# Patient Record
Sex: FEMALE | Race: WHITE | NOT HISPANIC OR LATINO
[De-identification: names, ages, dates, MRNs, and addresses within clinical notes are randomized per-mention and may not be internally consistent; named-entity substitution may affect disease eponyms.]

---

## 2018-01-23 PROBLEM — Z00.00 ENCOUNTER FOR PREVENTIVE HEALTH EXAMINATION: Status: ACTIVE | Noted: 2018-01-23

## 2018-01-29 ENCOUNTER — APPOINTMENT (OUTPATIENT)
Dept: OBGYN | Facility: CLINIC | Age: 68
End: 2018-01-29
Payer: COMMERCIAL

## 2018-01-29 VITALS
HEIGHT: 62.5 IN | RESPIRATION RATE: 16 BRPM | DIASTOLIC BLOOD PRESSURE: 100 MMHG | TEMPERATURE: 98.7 F | WEIGHT: 140 LBS | SYSTOLIC BLOOD PRESSURE: 152 MMHG | BODY MASS INDEX: 25.12 KG/M2

## 2018-01-29 DIAGNOSIS — Z86.79 PERSONAL HISTORY OF OTHER DISEASES OF THE CIRCULATORY SYSTEM: ICD-10-CM

## 2018-01-29 DIAGNOSIS — Z87.39 PERSONAL HISTORY OF OTHER DISEASES OF THE MUSCULOSKELETAL SYSTEM AND CONNECTIVE TISSUE: ICD-10-CM

## 2018-01-29 DIAGNOSIS — N95.1 MENOPAUSAL AND FEMALE CLIMACTERIC STATES: ICD-10-CM

## 2018-01-29 PROCEDURE — 82270 OCCULT BLOOD FECES: CPT

## 2018-01-29 PROCEDURE — 99387 INIT PM E/M NEW PAT 65+ YRS: CPT

## 2018-02-07 LAB — CYTOLOGY CVX/VAG DOC THIN PREP: NORMAL

## 2018-02-22 ENCOUNTER — APPOINTMENT (OUTPATIENT)
Dept: OBGYN | Facility: CLINIC | Age: 68
End: 2018-02-22
Payer: COMMERCIAL

## 2018-02-22 ENCOUNTER — APPOINTMENT (OUTPATIENT)
Dept: ANTEPARTUM | Facility: CLINIC | Age: 68
End: 2018-02-22
Payer: COMMERCIAL

## 2018-02-22 ENCOUNTER — ASOB RESULT (OUTPATIENT)
Age: 68
End: 2018-02-22

## 2018-02-22 DIAGNOSIS — D25.1 INTRAMURAL LEIOMYOMA OF UTERUS: ICD-10-CM

## 2018-02-22 DIAGNOSIS — D25.2 INTRAMURAL LEIOMYOMA OF UTERUS: ICD-10-CM

## 2018-02-22 DIAGNOSIS — R10.2 PELVIC AND PERINEAL PAIN: ICD-10-CM

## 2018-02-22 PROCEDURE — 76857 US EXAM PELVIC LIMITED: CPT

## 2018-02-22 PROCEDURE — 76830 TRANSVAGINAL US NON-OB: CPT

## 2018-02-22 PROCEDURE — 99214 OFFICE O/P EST MOD 30 MIN: CPT

## 2019-03-11 ENCOUNTER — EMERGENCY (EMERGENCY)
Facility: HOSPITAL | Age: 69
LOS: 0 days | Discharge: HOME | End: 2019-03-11
Attending: EMERGENCY MEDICINE | Admitting: EMERGENCY MEDICINE
Payer: MEDICARE

## 2019-03-11 VITALS — HEIGHT: 61 IN | WEIGHT: 134.92 LBS

## 2019-03-11 VITALS
SYSTOLIC BLOOD PRESSURE: 143 MMHG | TEMPERATURE: 98 F | HEART RATE: 78 BPM | RESPIRATION RATE: 16 BRPM | OXYGEN SATURATION: 98 % | DIASTOLIC BLOOD PRESSURE: 83 MMHG

## 2019-03-11 DIAGNOSIS — S91.312A LACERATION WITHOUT FOREIGN BODY, LEFT FOOT, INITIAL ENCOUNTER: ICD-10-CM

## 2019-03-11 DIAGNOSIS — Z79.82 LONG TERM (CURRENT) USE OF ASPIRIN: ICD-10-CM
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
## 2019-03-11 DIAGNOSIS — Z88.2 ALLERGY STATUS TO SULFONAMIDES: ICD-10-CM

## 2019-03-11 DIAGNOSIS — Z87.442 PERSONAL HISTORY OF URINARY CALCULI: ICD-10-CM

## 2019-03-11 DIAGNOSIS — M19.90 UNSPECIFIED OSTEOARTHRITIS, UNSPECIFIED SITE: ICD-10-CM
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
## 2019-03-11 DIAGNOSIS — Z23 ENCOUNTER FOR IMMUNIZATION: ICD-10-CM

## 2019-03-11 DIAGNOSIS — H40.9 UNSPECIFIED GLAUCOMA: ICD-10-CM

## 2019-03-11 PROCEDURE — 12001 RPR S/N/AX/GEN/TRNK 2.5CM/<: CPT

## 2019-03-11 PROCEDURE — 73630 X-RAY EXAM OF FOOT: CPT | Mod: 26,LT

## 2019-03-11 PROCEDURE — 99283 EMERGENCY DEPT VISIT LOW MDM: CPT

## 2019-03-11 RX ORDER — TETANUS TOXOID, REDUCED DIPHTHERIA TOXOID AND ACELLULAR PERTUSSIS VACCINE, ADSORBED 5; 2.5; 8; 8; 2.5 [IU]/.5ML; [IU]/.5ML; UG/.5ML; UG/.5ML; UG/.5ML
0.5 SUSPENSION INTRAMUSCULAR ONCE
Qty: 0 | Refills: 0 | Status: COMPLETED | OUTPATIENT
Start: 2019-03-11 | End: 2019-03-11

## 2019-03-11 RX ORDER — CEPHALEXIN 500 MG
500 CAPSULE ORAL
Qty: 0 | Refills: 0 | Status: DISCONTINUED | OUTPATIENT
Start: 2019-03-11 | End: 2019-03-11

## 2019-03-11 RX ORDER — CEPHALEXIN 500 MG
1 CAPSULE ORAL
Qty: 28 | Refills: 0 | OUTPATIENT
Start: 2019-03-11 | End: 2019-03-17

## 2019-03-11 RX ORDER — IBUPROFEN 200 MG
1 TABLET ORAL
Qty: 20 | Refills: 0 | OUTPATIENT
Start: 2019-03-11 | End: 2019-03-15

## 2019-03-11 RX ADMIN — TETANUS TOXOID, REDUCED DIPHTHERIA TOXOID AND ACELLULAR PERTUSSIS VACCINE, ADSORBED 0.5 MILLILITER(S): 5; 2.5; 8; 8; 2.5 SUSPENSION INTRAMUSCULAR at 20:05

## 2019-03-11 RX ADMIN — Medication 500 MILLIGRAM(S): at 20:04

## 2019-03-11 NOTE — ED ADULT NURSE NOTE - NSIMPLEMENTINTERV_GEN_ALL_ED
Implemented All Universal Safety Interventions:  [place] to call system. Call bell, personal items and telephone within reach. Instruct patient to call for assistance. Room bathroom lighting operational. Non-slip footwear when patient is off stretcher. Physically safe environment: no spills, clutter or unnecessary equipment. Stretcher in lowest position, wheels locked, appropriate side rails in place.

## 2019-03-11 NOTE — ED STATDOCS - NS_ ATTENDINGSCRIBEDETAILS _ED_A_ED_FT
The scribe's documentation has been prepared under my direction and personally reviewed by me in its entirety.  I confirm that the note above accurately reflects all my work, treatment, procedures, and decision making except where otherwise noted or amended by me.  George Lu M.D.

## 2019-03-11 NOTE — ED STATDOCS - ATTENDING CONTRIBUTION TO CARE
ZOEY Lu MD,  performed the initial face to face bedside interview with this patient regarding history of present illness, review of symptoms and relevant past medical, social and family history.  I completed an independent physical examination.  I was the initial provider who evaluated this patient. I have signed out the follow up of any pending tests (i.e. labs, radiological studies) to the ACP.  I have communicated the patient’s plan of care and disposition with the ACP.

## 2019-03-11 NOTE — ED ADULT NURSE NOTE - OBJECTIVE STATEMENT
pt a/ox3 to ed c/o right foot lac s/p dropping a glass bowl and stepping on it with Right foot while cooking dinner this evening. pt has gauze/kurlex wrapped on foot when arriving to ED area. no uncontrolled bleeding at this time. pt unknown when last tentus shot was. pt reports pain in Right foot 5/10. pt hesitant to ambulate of foot at this time, wheelchair provided.

## 2019-03-11 NOTE — ED ADULT TRIAGE NOTE - CHIEF COMPLAINT QUOTE
c/o left foot laceration from broken bowl, pt states while drop glass bowl on kitchen floor and then step on it. pt placed pressure dressing to foot, pt states she had one glass of wine this evening

## 2019-03-11 NOTE — ED STATDOCS - PROGRESS NOTE DETAILS
70 y/o F with PMHx of Glaucoma, OA, on ASA presenting to the ED c/o left foot laceration today. Reports that she dropped a glass salad bowl on the floor and stepped on the glass while barefoot, resulting in a laceration to the plantar aspect of her left foot. Denies any fevers, chills, abd pain, N/V/D, dizziness, lightheadedness, CP, or SOB. Allergic to Sulfa.  Galina Dorman PA-C Plan for podiatry to repair laceration.  Galina Dorman PA-C Podiatry completed laceration repair.  Will dc home with PO Keflex and Ibuprofen.  F/U with Dr. Garcia.  Keep dressing dry and clean until F/U.  Call office for appt.  Halley Hurst PA-C

## 2019-03-11 NOTE — ED STATDOCS - OBJECTIVE STATEMENT
70 y/o F with PMHx of Glaucoma, OA, on ASA presenting to the ED c/o left foot laceration today. Reports that she dropped a glass salad bowl on the floor and stepped on the glass while barefoot, resulting in a laceration to the plantar aspect of her left foot. Denies any fevers, chills, abd pain, N/V/D, dizziness, lightheadedness, CP, or SOB. Allergic to Sulfa.

## 2019-03-11 NOTE — ED STATDOCS - CLINICAL SUMMARY MEDICAL DECISION MAKING FREE TEXT BOX
70 y/o F with PMHx of Glaucoma, OA, on ASA presenting to the ED c/o left foot laceration today. 68 y/o F with PMHx of Glaucoma, OA, on ASA presenting to the ED c/o left foot laceration today. Exam with 3cm left foot laceration to the plantar, anterior surface. Plan: wound care, consult podiatry, X-ray, symptomatic treatment, reassess.

## 2019-12-25 PROBLEM — R10.2 PELVIC PAIN IN FEMALE: Status: ACTIVE | Noted: 2018-01-29

## 2022-03-16 ENCOUNTER — APPOINTMENT (OUTPATIENT)
Dept: GASTROENTEROLOGY | Facility: CLINIC | Age: 72
End: 2022-03-16

## 2022-03-24 NOTE — ED STATDOCS - CCCP TRG CHIEF CMPLNT
lacerations High Dose Vitamin A Pregnancy And Lactation Text: High dose vitamin A therapy is contraindicated during pregnancy and breast feeding.

## 2022-08-10 ENCOUNTER — EMERGENCY (EMERGENCY)
Facility: HOSPITAL | Age: 72
LOS: 0 days | Discharge: HOME | End: 2022-08-10
Attending: EMERGENCY MEDICINE
Payer: MEDICARE

## 2022-08-10 VITALS
OXYGEN SATURATION: 100 % | RESPIRATION RATE: 18 BRPM | HEART RATE: 66 BPM | TEMPERATURE: 98 F | DIASTOLIC BLOOD PRESSURE: 80 MMHG | SYSTOLIC BLOOD PRESSURE: 147 MMHG

## 2022-08-10 VITALS
SYSTOLIC BLOOD PRESSURE: 137 MMHG | TEMPERATURE: 99 F | OXYGEN SATURATION: 96 % | RESPIRATION RATE: 17 BRPM | DIASTOLIC BLOOD PRESSURE: 83 MMHG | HEART RATE: 73 BPM
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
## 2022-08-10 DIAGNOSIS — S01.01XA LACERATION WITHOUT FOREIGN BODY OF SCALP, INITIAL ENCOUNTER: ICD-10-CM

## 2022-08-10 DIAGNOSIS — Z87.442 PERSONAL HISTORY OF URINARY CALCULI: ICD-10-CM

## 2022-08-10 DIAGNOSIS — Z88.2 ALLERGY STATUS TO SULFONAMIDES: ICD-10-CM

## 2022-08-10 DIAGNOSIS — M19.90 UNSPECIFIED OSTEOARTHRITIS, UNSPECIFIED SITE: ICD-10-CM

## 2022-08-10 DIAGNOSIS — Z90.89 ACQUIRED ABSENCE OF OTHER ORGANS: ICD-10-CM
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
## 2022-08-10 DIAGNOSIS — S09.90XA UNSPECIFIED INJURY OF HEAD, INITIAL ENCOUNTER: ICD-10-CM

## 2022-08-10 DIAGNOSIS — H40.9 UNSPECIFIED GLAUCOMA: ICD-10-CM

## 2022-08-10 PROCEDURE — 12002 RPR S/N/AX/GEN/TRNK2.6-7.5CM: CPT

## 2022-08-10 PROCEDURE — 99284 EMERGENCY DEPT VISIT MOD MDM: CPT | Mod: FS,25

## 2022-08-10 PROCEDURE — 70450 CT HEAD/BRAIN W/O DYE: CPT | Mod: MA

## 2022-08-10 PROCEDURE — 70450 CT HEAD/BRAIN W/O DYE: CPT | Mod: 26,MA

## 2022-08-10 PROCEDURE — 99284 EMERGENCY DEPT VISIT MOD MDM: CPT | Mod: 25

## 2022-08-10 RX ORDER — BISOPROLOL FUMARATE 10 MG/1
0 TABLET, FILM COATED ORAL
Qty: 0 | Refills: 0 | DISCHARGE

## 2022-08-10 RX ORDER — LOSARTAN POTASSIUM 100 MG/1
0 TABLET, FILM COATED ORAL
Qty: 0 | Refills: 0 | DISCHARGE

## 2022-08-10 NOTE — ED STATDOCS - PATIENT PORTAL LINK FT
You can access the FollowMyHealth Patient Portal offered by [facility] by registering at the following website: [email or web address]. By joining [employer]’s FollowMyHealth portal, you will also be able to view your health information using other applications (apps) compatible with our system.

## 2022-08-10 NOTE — ED STATDOCS - OBJECTIVE STATEMENT
72 year old female with PMHx of glaucoma, osteoarthritis, renal calculi presents to the ED c/o laceration to eyebrow s/p head trauma. Pt was putting something into the back of her car, when she went back and her forehead on corner of trunk and suffered a laceration to eyebrow. No n/v, LOC. No aspirin or anticoag use. No other injuries or complaints. 72 year old female with PMHx of glaucoma, osteoarthritis, renal calculi presents to the ED c/o laceration to eyebrow s/p head trauma. Pt was putting something into the back of her car, when she went back and her forehead on corner of car door and suffered a laceration to eyebrow. No n/v, LOC. No aspirin or anticoag use. No other injuries or complaints.

## 2022-08-10 NOTE — ED STATDOCS - PROGRESS NOTE DETAILS
PA note: CT head NEG, results discussed in detail with patient. LAC repaired under sterile fashion, see procedure note. Patient re-examined and re-evaluated. Patient feels much better at this time. ED evaluation, Diagnosis and management discussed with the patient in detail. Workup results discussed with ED attending, OK to dc home. Close PMD follow up encouraged, aftercare to assist with scheduling appointment ASAP. Strict ED return instructions discussed in detail and patient given the opportunity to ask any questions about their discharge diagnosis and instructions. Patient verbalized understanding. ~ Frederick Valladares PA-C PA: Patient is a 72 year old female with PMHx of glaucoma, osteoarthritis, renal calculi who presents to [facility] c/o head LAC after she got hit by a car trunk gate. No LOC. Pt was putting something into the back of her car, when she went back and her forehead hit on corner of car's trunk door and suffered a laceration. No n/v, LOC. No aspirin or anticoag use. No other injuries or complaints. TDAP is up to date, last received in 2019. ~Frederick Valladares PA-C

## 2022-08-10 NOTE — ED ADULT TRIAGE NOTE - AS HEIGHT TYPE
Propranolol Counseling:  I discussed with the patient the risks of propranolol including but not limited to low heart rate, low blood pressure, low blood sugar, restlessness and increased cold sensitivity. They should call the office if they experience any of these side effects. stated

## 2022-08-10 NOTE — ED STATDOCS - NS ED ROS FT
Constitutional: nad, well appearing  HEENT:  no nasal congestion, eye drainage or ear pain.    CVS:  no cp  Resp:  No sob, no cough  GI:  no abdominal pain, no nausea or vomiting  :  no dysuria  MSK: no joint pain or limited ROM  Skin: no rash. +laceration  Neuro: no change in mental status or level of consciousness  Heme/lymph: no bleeding

## 2022-08-10 NOTE — ED STATDOCS - NSFOLLOWUPINSTRUCTIONS_ED_ALL_ED_FT
Sutures, Staples, or Adhesive Wound Closure      Wound closure refers to holding skin and underlying tissue together while it heals, such as after surgery or after an injury. Health care providers use stitches (sutures), staples, and special types of glue (skin adhesives) to close wounds. Your health care provider will use a wound closure method that helps you heal quickly and reduces the chances of infection or scarring. The type of wound closure depends on the location, size, and depth of your wound.    In most cases, wounds are closed as soon as possible (primary skin closure). Sometimes, closure is delayed so the wound can be cleaned and then can heal naturally over weeks or months (delayed wound closure). This reduces the chance of infection.      What are the different types of wound closure?    Adhesive glue     To use adhesive glue, your health care provider holds the edges of the wound together and paints the glue on the surface of your skin. You may need more than one layer of glue. Once the glue is dry, the wound may be covered with a dressing.    This type of skin closure may be used for small wounds that are not deep (superficial wounds). It is often used for children and on facial wounds. Adhesive glue is less painful than other methods of wound closure, and it does not require a medicine to numb the area (local anesthetic). This method also leaves nothing to be removed.    Adhesive glue cannot be used for wounds that are deep, uneven, or bleeding. It is not used inside of a wound.    Adhesive strips     These strips are made of paper that is sticky (adhesive) and has many small holes in it (is porous). They are applied across your wound edges like a regular bandage.    Adhesive strips may be used to close very shallow wounds. They may be used along with sutures to improve skin closure.    Sutures     Sutures come in many different materials, strengths, and sizes. They may break down as your wound heals (absorbable), or they may need to be removed (nonabsorbable).    Your health care provider sews your skin or the tissues under your skin together with sutures and a steel needle. Your skin edges may be closed in one long (continuous) stitch or in separate stitches. Then the sutures are tied and cut.    Sutures can be used for all kinds of wounds. Absorbable sutures may be used to close tissues under the skin. Sutures can cause a skin reaction that can lead to infection.    Staples     When staples are used to close a wound, the edges of your skin on both sides of the wound are brought close together. A staple is placed across the wound, and an instrument secures the staple edges together.    Staples are often used to close surgical incisions. They are faster to use than sutures, and they cause less skin reaction. Staples need to be removed using a tool that bends the staples away from your skin.      Follow these instructions at home:    Medicines     •Take over-the-counter and prescription medicines only as told by your health care provider.      •If you were prescribed an antibiotic medicine, take it as told by your health care provider. Do not stop taking the antibiotic even if you start to feel better.        Wound care      •Follow instructions from your health care provider about how to take care of your wound and dressing.      •Wash your hands with soap and water before and after touching your wound or dressing. If soap and water are not available, use hand .    • Do not try to remove your wound closures unless your health care provider tells you to do that. You may need a follow-up visit with your health care provider to remove your closures.  •Wound closures may stay in place for 2 weeks or longer.      •Absorbable sutures may dissolve after a few days or weeks.      •If adhesive strip edges start to loosen and curl up, you may trim the loose edges.        • Do not pick at your wound. Picking can cause an infection.      •Apply ointments or creams only as told by your health care provider.    •Check your wound every day for signs of infection. Check for:  •Redness, swelling, or pain.      •Fluid or blood.      •Warmth.      •Pus or a bad smell.        General instructions     • Do not take baths, swim, or use a hot tub until your health care provider approves. Ask your health care provider if you may take showers. You may only be allowed to take sponge baths.      • Do not soak your wound in water.      •Keep all follow-up visits as told by your health care provider. This is important.        Contact a health care provider if you:    •Have a fever or chills.      •Have redness, swelling, or pain around your wound.      •Have fluid or blood coming from your wound.      •Notice that your wound feels warm to the touch.      •Notice pus or a bad smell coming from your wound.      •Notice that the edges of your wound start to separate after your sutures come out.      •Notice that your wound becomes thick, raised, and darker in color after your sutures come out (scarring).        Summary    •The type of wound closure that your health care provider will use depends on the location, size, and depth of your wound. Options to close wounds include stitches (sutures), staples, special types of glue (skin adhesives), and adhesive strips.      •Your health care provider will use a wound closure method that helps you heal quickly and reduces the chances of infection or scarring.      • Do not soak your wound in water. Do not take baths, shower, swim, or use a hot tub until your health care provider approves.      This information is not intended to replace advice given to you by your health care provider. Make sure you discuss any questions you have with your health care provider.                                                                                                                                                                                Head Injury, Adult       There are many types of head injuries. Head injuries can be as minor as a small bump, or they can be a serious medical issue. More severe head injuries include:  •A jarring injury to the brain (concussion).      •A bruise (contusion) of the brain. This means there is bleeding in the brain that can cause swelling.      •A cracked skull (skull fracture).      •Bleeding in the brain that collects, clots, and forms a bump (hematoma).      After a head injury, most problems occur within the first 24 hours, but side effects may occur up to 7–10 days after the injury. It is important to watch your condition for any changes. You may need to be observed in the emergency department or urgent care, or you may be admitted to the hospital.      What are the causes?    There are many possible causes of a head injury. Serious head injuries may be caused by car accidents, bicycle or motorcycle accidents, sports injuries, falls, or being struck by an object.      What are the symptoms?    Symptoms of a head injury include a contusion, bump, or bleeding at the site of the injury. Other physical symptoms may include:  •Headache.      •Nausea or vomiting.      •Dizziness.      •Blurred or double vision.      •Being uncomfortable around bright lights or loud noises.      •Seizures.      •Feeling tired.      •Trouble being awakened.      •Loss of consciousness.      Mental or emotional symptoms may include:  •Irritability.      •Confusion and memory problems.      •Poor attention and concentration.      •Changes in eating or sleeping habits.      •Anxiety or depression.        How is this diagnosed?    This condition can usually be diagnosed based on your symptoms, a description of the injury, and a physical exam. You may also have imaging tests done, such as a CT scan or an MRI.      How is this treated?    Treatment for this condition depends on the severity and type of injury you have. The main goal of treatment is to prevent complications and allow the brain time to heal.    Mild head injury     If you have a mild head injury, you may be sent home, and treatment may include:  •Observation. A responsible adult should stay with you for 24 hours after your injury and check on you often.      •Physical rest.      •Brain rest.      •Pain medicines.      Severe head injury    If you have a severe head injury, treatment may include:•Close observation. This includes hospitalization with the following care:  •Frequent physical exams.      •Frequent checks of how your brain and nervous system are working (neurological status).      •Checking your blood pressure and oxygen levels.        •Medicines to relieve pain, prevent seizures, and decrease brain swelling.      •Airway protection and breathing support. This may include using a ventilator.      •Treatments that monitor and manage swelling inside the brain.    •Brain surgery. This may be needed to:  •Remove a collection of blood or blood clots.      •Stop the bleeding.      •Remove a part of the skull to allow room for the brain to swell.          Follow these instructions at home:    Activity     •Rest and avoid activities that are physically hard or tiring.      •Make sure you get enough sleep.    •Let your brain rest by limiting activities that require a lot of thought or attention, such as:  •Watching TV.      •Playing memory games and puzzles.      •Job-related work or homework.      •Working on the computer, using social media, and texting.        •Avoid activities that could cause another head injury, such as playing sports, until your health care provider approves. Having another head injury, especially before the first one has healed, can be dangerous.      •Ask your health care provider when it is safe for you to return to your regular activities, including work or school. Ask your health care provider for a step-by-step plan for gradually returning to activities.      •Ask your health care provider when you can drive, ride a bicycle, or use heavy machinery. Your ability to react may be slower after a brain injury. Do not do these activities if you are dizzy.        Lifestyle      • Do not drink alcohol until your health care provider approves. Do not use drugs. Alcohol and certain drugs may slow your recovery and can put you at risk of further injury.      •If it is harder than usual to remember things, write them down.      •If you are easily distracted, try to do one thing at a time.      •Talk with family members or close friends when making important decisions.      •Tell your friends, family, a trusted colleague, and  about your injury, symptoms, and restrictions. Have them watch for any new or worsening problems.      General instructions     •Take over-the-counter and prescription medicines only as told by your health care provider.      •Have someone stay with you for 24 hours after your head injury. This person should watch you for any changes in your symptoms and be ready to seek medical help.      •Keep all follow-up visits as told by your health care provider. This is important.        How is this prevented?    •Work on improving your balance and strength to avoid falls.      •Wear a seat belt when you are in a moving vehicle.      •Wear a helmet when riding a bicycle, skiing, or doing any other sport or activity that has a risk of injury.    •If you drink alcohol:•Limit how much you use to:  •0–1 drink a day for nonpregnant women.      •0–2 drinks a day for men.        •Be aware of how much alcohol is in your drink. In the [place] one drink equals one 12 oz bottle of beer (355 mL), one 5 oz glass of wine (148 mL), or one 1½ oz glass of hard liquor (44 mL).      •Take safety measures in your home, such as:  •Removing clutter and tripping hazards from floors and stairways.      •Using grab bars in bathrooms and handrails by stairs.      •Placing non-slip mats on floors and in bathtubs.      •Improving lighting in dim areas.          Where to find more information    •Centers for Disease Control and Prevention: www.cdc.gov        Get help right away if:  •You have:  •A severe headache that is not helped by medicine.      •Trouble walking or weakness in your arms and legs.      •Clear or bloody fluid coming from your nose or ears.      •Changes in your vision.      •A seizure.      •Increased confusion or irritability.        •Your symptoms get worse.      •You are sleepier than normal and have trouble staying awake.      •You lose your balance.      •Your pupils change size.      •Your speech is slurred.      •Your dizziness gets worse.      •You vomit.      These symptoms may represent a serious problem that is an emergency. Do not wait to see if the symptoms will go away. Get medical help right away. Call your local emergency services (911 in the [place] Do not drive yourself to the hospital.       Summary    •Head injuries can be minor, or they can be a serious medical issue requiring immediate attention.      •Treatment for this condition depends on the severity and type of injury you have.      •Have someone stay with you for 24 hours after your injury and check on you often.      •Ask your health care provider when it is safe for you to return to your regular activities, including work or school.      •Head injury prevention includes wearing a seat belt in a motor vehicle, using a helmet on a bicycle, limiting alcohol use, and taking safety measures in your home.      This information is not intended to replace advice given to you by your health care provider. Make sure you discuss any questions you have with your health care provider.

## 2022-08-10 NOTE — ED STATDOCS - ENMT, MLM
Nasal mucosa clear.  Mouth with normal mucosa. Throat is clear. HEAD: +4.0cm linear superficial LAC noted left frontal scalp area. Minimal bleeding. No FB's

## 2022-08-10 NOTE — ED STATDOCS - NS ED ATTENDING STATEMENT MOD
This was a shared visit with the ADRYAN. I reviewed and verified the documentation and independently performed the documented:

## 2022-08-10 NOTE — ED ADULT NURSE NOTE - NS PRO PASSIVE SMOKE EXP
• FOR REFILL REQUESTS INCLUDING CONTROLLED SUBSTANCES   Please contact your pharmacy at least three (3) business days before your medication runs out.   The pharmacy will then send us a request for your refill.  Please allow 24-48 hours for the refill to be processed.   ?  • FOR CONTROLLED SUBSTANCE REFILL REQUESTS   Please call the clinic Monday through Friday, from 8:00am - 5:00pm to speak with a Patient .  ?  LAB AND X-RAY HOURS FOR [address] [place] [address]  Monday - Friday 7 am - 4:30 pm  ?  TEST RESULTS  If your physician has ordered additional laboratory or radiology testing as part of your ongoing plan of care, notification of the test results will be communicated in a timely manner once reviewed by your provider.   - If your results are normal, you will receive a letter in the mail.   - If there are any irregularities, you will receive a phone call from our office within 5 - 7 business days.   - If your results are critical and require more immediate intervention, you will be contacted promptly.   ?  YOUR OPINION MATTERS  You may be receiving a patient satisfaction survey in the mail. We would appreciate it if you could please take the time to complete, as your feedback is very important to us. We strive to make your experience exceptional and your comments help us with that goal. We look forward to hearing from you. Feedback is anonymous unless you choose otherwise.       No

## 2022-08-10 NOTE — ED STATDOCS - PHYSICAL EXAMINATION
Constitutional: NAD, well appearing  HEENT: no rhinorrhea, PERRL, no oropharyngeal erythema or exudates, midline uvula.  TMs clear.  CVS:  RRR, no m/r/g  Resp:  CTAB  GI: soft, ntnd  MSK:  no restriction to rom, full ROM to all extremities  Neuro:  A&Ox3, 5/5 strength to all extremities,  SILT to all extremities  Skin: no rash. left eyebrow laceration, slight bleeding, no bony step offs  psych: clear thought content  Heme/lymph:  No LAD

## 2022-08-10 NOTE — ED ADULT TRIAGE NOTE - CHIEF COMPLAINT QUOTE
pt presents to ED S/P head injury  hit head on car door - LOC no blood thinner NA called 1 1/2 inch lac to head

## 2022-08-10 NOTE — ED STATDOCS - CLINICAL SUMMARY MEDICAL DECISION MAKING FREE TEXT BOX
Will ct head, irrigate wound and repair. Tetanus is utd. d/c home. Will ct head, irrigate wound and repair. Tetanus is utd. d/c home.    PA note: CT head NEG, results discussed in detail with patient. LAC repaired under sterile fashion, see procedure note. Patient re-examined and re-evaluated. Patient feels much better at this time. ED evaluation, Diagnosis and management discussed with the patient in detail. Workup results discussed with ED attending, OK to dc home. Close PMD follow up encouraged, aftercare to assist with scheduling appointment ASAP. Strict ED return instructions discussed in detail and patient given the opportunity to ask any questions about their discharge diagnosis and instructions. Patient verbalized understanding. ~ Frederick Valladares PA-C Will ct head, irrigate wound and repair. Tetanus is utd. Anticipate d/c home.        PA note: CT head NEG, results discussed in detail with patient. LAC repaired under sterile fashion, see procedure note. Patient re-examined and re-evaluated. Patient feels much better at this time. ED evaluation, Diagnosis and management discussed with the patient in detail. Workup results discussed with ED attending, OK to dc home. Close PMD follow up encouraged, aftercare to assist with scheduling appointment ASAP. Strict ED return instructions discussed in detail and patient given the opportunity to ask any questions about their discharge diagnosis and instructions. Patient verbalized understanding. ~ Frederick Valladares PA-C

## 2025-01-27 ENCOUNTER — LABORATORY RESULT (OUTPATIENT)
Age: 75
End: 2025-01-27

## 2025-01-27 ENCOUNTER — APPOINTMENT (OUTPATIENT)
Dept: RHEUMATOLOGY | Facility: CLINIC | Age: 75
End: 2025-01-27
Payer: MEDICARE

## 2025-01-27 ENCOUNTER — NON-APPOINTMENT (OUTPATIENT)
Age: 75
End: 2025-01-27

## 2025-01-27 VITALS
DIASTOLIC BLOOD PRESSURE: 80 MMHG | HEART RATE: 89 BPM | WEIGHT: 140 LBS | OXYGEN SATURATION: 99 % | TEMPERATURE: 97.6 F | BODY MASS INDEX: 25.12 KG/M2 | SYSTOLIC BLOOD PRESSURE: 122 MMHG | HEIGHT: 62.5 IN

## 2025-01-27 DIAGNOSIS — H20.9 UNSPECIFIED IRIDOCYCLITIS: ICD-10-CM

## 2025-01-27 DIAGNOSIS — K52.832 LYMPHOCYTIC COLITIS: ICD-10-CM

## 2025-01-27 PROCEDURE — 99205 OFFICE O/P NEW HI 60 MIN: CPT

## 2025-01-27 PROCEDURE — 36415 COLL VENOUS BLD VENIPUNCTURE: CPT

## 2025-01-27 RX ORDER — BISOPROLOL FUMARATE 5 MG/1
5 TABLET, FILM COATED ORAL
Refills: 0 | Status: ACTIVE | COMMUNITY

## 2025-01-27 RX ORDER — NIFEDIPINE 10 MG/1
10 CAPSULE ORAL
Refills: 0 | Status: ACTIVE | COMMUNITY

## 2025-01-27 RX ORDER — LOSARTAN POTASSIUM 100 MG/1
100 TABLET, FILM COATED ORAL
Refills: 0 | Status: ACTIVE | COMMUNITY

## 2025-01-28 LAB
B2 MICROGLOB SERPL-MCNC: 2.1 MG/L
CRP SERPL-MCNC: <3 MG/L
ERYTHROCYTE [SEDIMENTATION RATE] IN BLOOD BY WESTERGREN METHOD: 5 MM/HR
RHEUMATOID FACT SER QL: 10 IU/ML

## 2025-01-29 LAB
APPEARANCE: CLEAR
BILIRUBIN URINE: NEGATIVE
BLOOD URINE: NEGATIVE
COLOR: NORMAL
GLUCOSE QUALITATIVE U: NEGATIVE MG/DL
KETONES URINE: ABNORMAL MG/DL
LEUKOCYTE ESTERASE URINE: ABNORMAL
NITRITE URINE: NEGATIVE
PH URINE: 5.5
PROTEIN URINE: NORMAL MG/DL
SPECIFIC GRAVITY URINE: 1.03
UROBILINOGEN URINE: 1 MG/DL

## 2025-01-30 LAB
ACE BLD-CCNC: 80 U/L
ANA SER IF-ACNC: NEGATIVE
B2 MICROGLOB 24H UR-MCNC: 138 UG/L
T PALLIDUM AB SER QL IA: NEGATIVE

## 2025-01-31 DIAGNOSIS — M19.041 PRIMARY OSTEOARTHRITIS, RIGHT HAND: ICD-10-CM

## 2025-01-31 DIAGNOSIS — M19.042 PRIMARY OSTEOARTHRITIS, RIGHT HAND: ICD-10-CM

## 2025-01-31 LAB
LYSOZYME SERPL-MCNC: 4.2 UG/ML
M TB IFN-G BLD-IMP: NEGATIVE
QUANTIFERON TB PLUS MITOGEN MINUS NIL: >10 IU/ML
QUANTIFERON TB PLUS NIL: 0.03 IU/ML
QUANTIFERON TB PLUS TB1 MINUS NIL: 0 IU/ML
QUANTIFERON TB PLUS TB2 MINUS NIL: 0 IU/ML

## 2025-02-06 ENCOUNTER — APPOINTMENT (OUTPATIENT)
Dept: RADIOLOGY | Facility: CLINIC | Age: 75
End: 2025-02-06

## 2025-02-09 LAB
HLA METHODOLOGY: NORMAL
HLA-B ALLELE 1: NORMAL
HLA-B ALLELE 2: NORMAL

## 2025-02-12 ENCOUNTER — NON-APPOINTMENT (OUTPATIENT)
Age: 75
End: 2025-02-12

## 2025-03-10 ENCOUNTER — APPOINTMENT (OUTPATIENT)
Dept: ORTHOPEDIC SURGERY | Facility: CLINIC | Age: 75
End: 2025-03-10

## 2025-03-11 ENCOUNTER — APPOINTMENT (OUTPATIENT)
Dept: RADIOLOGY | Facility: CLINIC | Age: 75
End: 2025-03-11

## 2025-03-14 ENCOUNTER — OUTPATIENT (OUTPATIENT)
Dept: OUTPATIENT SERVICES | Facility: HOSPITAL | Age: 75
LOS: 1 days | End: 2025-03-14
Payer: MEDICARE

## 2025-03-14 ENCOUNTER — APPOINTMENT (OUTPATIENT)
Dept: RADIOLOGY | Facility: CLINIC | Age: 75
End: 2025-03-14
Payer: MEDICARE

## 2025-03-14 DIAGNOSIS — H50.9 UNSPECIFIED STRABISMUS: ICD-10-CM

## 2025-03-14 DIAGNOSIS — M19.041 PRIMARY OSTEOARTHRITIS, RIGHT HAND: ICD-10-CM

## 2025-03-14 DIAGNOSIS — K52.832 LYMPHOCYTIC COLITIS: ICD-10-CM

## 2025-03-14 PROCEDURE — 72200 X-RAY EXAM SI JOINTS: CPT

## 2025-03-14 PROCEDURE — 73120 X-RAY EXAM OF HAND: CPT | Mod: 26,50

## 2025-03-14 PROCEDURE — 73120 X-RAY EXAM OF HAND: CPT

## 2025-03-14 PROCEDURE — 72200 X-RAY EXAM SI JOINTS: CPT | Mod: 26

## 2025-03-17 ENCOUNTER — APPOINTMENT (OUTPATIENT)
Facility: CLINIC | Age: 75
End: 2025-03-17
Payer: MEDICARE

## 2025-03-17 VITALS — BODY MASS INDEX: 25.76 KG/M2 | HEIGHT: 62 IN | WEIGHT: 140 LBS

## 2025-03-17 DIAGNOSIS — M25.462 EFFUSION, LEFT KNEE: ICD-10-CM

## 2025-03-17 PROCEDURE — 73564 X-RAY EXAM KNEE 4 OR MORE: CPT | Mod: LT

## 2025-03-17 PROCEDURE — 99203 OFFICE O/P NEW LOW 30 MIN: CPT

## 2025-03-18 PROBLEM — M25.462 EFFUSION OF LEFT KNEE: Status: ACTIVE | Noted: 2025-03-18

## 2025-03-24 ENCOUNTER — APPOINTMENT (OUTPATIENT)
Dept: RHEUMATOLOGY | Facility: CLINIC | Age: 75
End: 2025-03-24
Payer: MEDICARE

## 2025-03-24 VITALS
HEART RATE: 61 BPM | SYSTOLIC BLOOD PRESSURE: 112 MMHG | DIASTOLIC BLOOD PRESSURE: 80 MMHG | HEIGHT: 62 IN | WEIGHT: 140 LBS | OXYGEN SATURATION: 97 % | BODY MASS INDEX: 25.76 KG/M2

## 2025-03-24 DIAGNOSIS — M19.042 PRIMARY OSTEOARTHRITIS, RIGHT HAND: ICD-10-CM

## 2025-03-24 DIAGNOSIS — M19.041 PRIMARY OSTEOARTHRITIS, RIGHT HAND: ICD-10-CM

## 2025-03-24 PROCEDURE — 99214 OFFICE O/P EST MOD 30 MIN: CPT

## 2025-08-15 ENCOUNTER — APPOINTMENT (OUTPATIENT)
Facility: CLINIC | Age: 75
End: 2025-08-15
Payer: MEDICARE

## 2025-08-15 VITALS — WEIGHT: 135 LBS | HEIGHT: 62 IN | BODY MASS INDEX: 24.84 KG/M2

## 2025-08-15 DIAGNOSIS — Z78.9 OTHER SPECIFIED HEALTH STATUS: ICD-10-CM

## 2025-08-15 DIAGNOSIS — M25.462 EFFUSION, LEFT KNEE: ICD-10-CM

## 2025-08-15 PROCEDURE — 20610 DRAIN/INJ JOINT/BURSA W/O US: CPT | Mod: LT

## 2025-08-15 PROCEDURE — 99213 OFFICE O/P EST LOW 20 MIN: CPT | Mod: 25

## 2025-08-27 ENCOUNTER — APPOINTMENT (OUTPATIENT)
Facility: CLINIC | Age: 75
End: 2025-08-27